# Patient Record
Sex: MALE | Race: WHITE
[De-identification: names, ages, dates, MRNs, and addresses within clinical notes are randomized per-mention and may not be internally consistent; named-entity substitution may affect disease eponyms.]

---

## 2019-03-16 ENCOUNTER — HOSPITAL ENCOUNTER (OUTPATIENT)
Dept: HOSPITAL 88 - ER | Age: 84
Setting detail: OBSERVATION
LOS: 3 days | Discharge: HOME | End: 2019-03-19
Attending: INTERNAL MEDICINE | Admitting: INTERNAL MEDICINE
Payer: MEDICARE

## 2019-03-16 VITALS — BODY MASS INDEX: 22.79 KG/M2 | WEIGHT: 150.38 LBS | HEIGHT: 68 IN

## 2019-03-16 VITALS — DIASTOLIC BLOOD PRESSURE: 77 MMHG | SYSTOLIC BLOOD PRESSURE: 191 MMHG

## 2019-03-16 DIAGNOSIS — G30.9: ICD-10-CM

## 2019-03-16 DIAGNOSIS — R53.81: ICD-10-CM

## 2019-03-16 DIAGNOSIS — F02.80: ICD-10-CM

## 2019-03-16 DIAGNOSIS — R55: Primary | ICD-10-CM

## 2019-03-16 DIAGNOSIS — Z88.5: ICD-10-CM

## 2019-03-16 DIAGNOSIS — I95.9: ICD-10-CM

## 2019-03-16 DIAGNOSIS — R53.1: ICD-10-CM

## 2019-03-16 LAB
ALBUMIN SERPL-MCNC: 2.9 G/DL (ref 3.5–5)
ALBUMIN/GLOB SERPL: 0.7 {RATIO} (ref 0.8–2)
ALP SERPL-CCNC: 44 IU/L (ref 40–150)
ALT SERPL-CCNC: 16 IU/L (ref 0–55)
ANION GAP SERPL CALC-SCNC: 12.7 MMOL/L (ref 8–16)
BASOPHILS # BLD AUTO: 0 10*3/UL (ref 0–0.1)
BASOPHILS NFR BLD AUTO: 0.5 % (ref 0–1)
BUN SERPL-MCNC: 19 MG/DL (ref 7–26)
BUN/CREAT SERPL: 16 (ref 6–25)
CALCIUM SERPL-MCNC: 7.8 MG/DL (ref 8.4–10.2)
CHLORIDE SERPL-SCNC: 106 MMOL/L (ref 98–107)
CK MB SERPL-MCNC: 1.1 NG/ML (ref 0–5)
CK SERPL-CCNC: 153 IU/L (ref 30–200)
CO2 SERPL-SCNC: 26 MMOL/L (ref 22–29)
DEPRECATED APTT PLAS QN: 36.2 SECONDS (ref 23.8–35.5)
DEPRECATED INR PLAS: 1.08
DEPRECATED NEUTROPHILS # BLD AUTO: 3.2 10*3/UL (ref 2.1–6.9)
EGFRCR SERPLBLD CKD-EPI 2021: 59 ML/MIN (ref 60–?)
EOSINOPHIL # BLD AUTO: 0.2 10*3/UL (ref 0–0.4)
EOSINOPHIL NFR BLD AUTO: 3.5 % (ref 0–6)
EOSINOPHIL NFR BLD MANUAL: 3 % (ref 0–7)
ERYTHROCYTE [DISTWIDTH] IN CORD BLOOD: 12.6 % (ref 11.7–14.4)
GLOBULIN PLAS-MCNC: 4 G/DL (ref 2.3–3.5)
GLUCOSE SERPLBLD-MCNC: 93 MG/DL (ref 74–118)
HCT VFR BLD AUTO: 42.1 % (ref 38.2–49.6)
HGB BLD-MCNC: 14.2 G/DL (ref 14–18)
LYMPHOCYTES # BLD: 1.8 10*3/UL (ref 1–3.2)
LYMPHOCYTES NFR BLD AUTO: 30.7 % (ref 18–39.1)
LYMPHOCYTES NFR BLD MANUAL: 29 % (ref 19–48)
MCH RBC QN AUTO: 32.3 PG (ref 28–32)
MCHC RBC AUTO-ENTMCNC: 33.7 G/DL (ref 31–35)
MCV RBC AUTO: 95.7 FL (ref 81–99)
MONOCYTES # BLD AUTO: 0.7 10*3/UL (ref 0.2–0.8)
MONOCYTES NFR BLD AUTO: 11.1 % (ref 4.4–11.3)
MONOCYTES NFR BLD MANUAL: 12 % (ref 3.4–9)
NEUTS SEG NFR BLD AUTO: 53.9 % (ref 38.7–80)
NEUTS SEG NFR BLD MANUAL: 50 % (ref 40–74)
PLAT MORPH BLD: NORMAL
PLATELET # BLD AUTO: 193 X10E3/UL (ref 140–360)
PLATELET # BLD EST: ADEQUATE 10*3/UL
POTASSIUM SERPL-SCNC: 3.7 MMOL/L (ref 3.5–5.1)
PROTHROMBIN TIME: 14.5 SECONDS (ref 11.9–14.5)
RBC # BLD AUTO: 4.4 X10E6/UL (ref 4.3–5.7)
RBC MORPH BLD: NORMAL
SODIUM SERPL-SCNC: 141 MMOL/L (ref 136–145)

## 2019-03-16 PROCEDURE — 83880 ASSAY OF NATRIURETIC PEPTIDE: CPT

## 2019-03-16 PROCEDURE — 85025 COMPLETE CBC W/AUTO DIFF WBC: CPT

## 2019-03-16 PROCEDURE — 97139 UNLISTED THERAPEUTIC PX: CPT

## 2019-03-16 PROCEDURE — 82550 ASSAY OF CK (CPK): CPT

## 2019-03-16 PROCEDURE — 71045 X-RAY EXAM CHEST 1 VIEW: CPT

## 2019-03-16 PROCEDURE — 72125 CT NECK SPINE W/O DYE: CPT

## 2019-03-16 PROCEDURE — 80053 COMPREHEN METABOLIC PANEL: CPT

## 2019-03-16 PROCEDURE — 81001 URINALYSIS AUTO W/SCOPE: CPT

## 2019-03-16 PROCEDURE — 97162 PT EVAL MOD COMPLEX 30 MIN: CPT

## 2019-03-16 PROCEDURE — 85610 PROTHROMBIN TIME: CPT

## 2019-03-16 PROCEDURE — 36415 COLL VENOUS BLD VENIPUNCTURE: CPT

## 2019-03-16 PROCEDURE — 93005 ELECTROCARDIOGRAM TRACING: CPT

## 2019-03-16 PROCEDURE — 87086 URINE CULTURE/COLONY COUNT: CPT

## 2019-03-16 PROCEDURE — 93306 TTE W/DOPPLER COMPLETE: CPT

## 2019-03-16 PROCEDURE — 83036 HEMOGLOBIN GLYCOSYLATED A1C: CPT

## 2019-03-16 PROCEDURE — 70450 CT HEAD/BRAIN W/O DYE: CPT

## 2019-03-16 PROCEDURE — 93880 EXTRACRANIAL BILAT STUDY: CPT

## 2019-03-16 PROCEDURE — 84484 ASSAY OF TROPONIN QUANT: CPT

## 2019-03-16 PROCEDURE — 95816 EEG AWAKE AND DROWSY: CPT

## 2019-03-16 PROCEDURE — 82607 VITAMIN B-12: CPT

## 2019-03-16 PROCEDURE — 82553 CREATINE MB FRACTION: CPT

## 2019-03-16 PROCEDURE — 80048 BASIC METABOLIC PNL TOTAL CA: CPT

## 2019-03-16 PROCEDURE — 97116 GAIT TRAINING THERAPY: CPT

## 2019-03-16 PROCEDURE — 85730 THROMBOPLASTIN TIME PARTIAL: CPT

## 2019-03-16 PROCEDURE — 70551 MRI BRAIN STEM W/O DYE: CPT

## 2019-03-16 PROCEDURE — 84443 ASSAY THYROID STIM HORMONE: CPT

## 2019-03-16 NOTE — DIAGNOSTIC IMAGING REPORT
CT BRAIN WO



HISTORY: Fall



COMPARISON:  Report from head CT dated 9/8/2015 (images not available at time

of dictation)



Technique: 

Noncontrast axial scans were obtained from skull base to the vertex.  Coronal

and sagittal reconstructions obtained from the axial data.  One or more of the

following dose reduction techniques were used: Automated exposure control,

adjustment of the mA and/or kV according to patient size, and/or utilization of

iterative reconstruction technique.



DISCUSSION:



Scalp/Skull: Unremarkable.

Brain sulci: Mildly prominent.

Ventricles: Compensatory dilatation.

Extra-axial spaces: No masses or fluid collections. Carotid and vertebral

artery calcifications are present.



Parenchyma: 

Mild bilateral deep white matter hypodensity is likely chronic microvascular

ischemic change. 

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

Otherwise, no masses, hemorrhage, or large vascular territory acute infarct.



Dural sinuses:  No abnormal densities.

Sellar/Suprasellar region: Intact.

Skull base: Intact.

Incidental findings: Mild scattered paranasal sinus mucosal thickening is

partially visualized. Both ocular lenses are thinned.



IMPRESSION:

1.  No acute intracranial abnormalities.

2.  Mild supratentorial chronic microvascular ischemic change. Mild generalized

cerebral volume loss.





Signed by: Dr. Jeff Carlisle M.D. on 3/16/2019 6:30 PM

## 2019-03-16 NOTE — DIAGNOSTIC IMAGING REPORT
CT CERVICAL SPINE WO



HISTORY: Fall



COMPARISON:  Concurrent head CT



TECHNIQUE: CT of the cervical spine without contrast.  Sagittal and coronal

reformations were created.  One or more of the following dose reduction

techniques were used: Automated exposure control, adjustment of the mA and/or

kV according to patient size, and/or utilization of iterative reconstruction

technique.



FINDINGS:  

Mild bone demineralization limits evaluation.

Cervical lordosis is straightened.

Thoracic dextroscoliosis is partially visualized.



No definite acute fracture or compression deformity is seen.  

The craniocervical junction is intact. 

No gross spinal canal masses are seen. 

The paravertebral and paraspinal soft tissues are unremarkable. 



Multilevel spondylosis is advanced at C5-C6 and C6-C7. The C3 and C4 vertebral

bodies are fused. Prominent multilevel bilateral facet arthrosis is also

present. There is associated minimal grade 1 anterolisthesis of C4 on C5, C6 on

C7, C7 on T1.



Prominent bilateral carotid bulb calcifications are present. There is a small

partially calcified left thyroid nodule.



IMPRESSION:  

1.  No acute osseous abnormalities.

2.  Multilevel spondylosis, advanced at C5-C6 and C6-C7. 

3.  Prominent multilevel bilateral facet arthrosis. Associated minimal grade 1

anterolisthesis of C4 on C5, C6 on C7, and C7 on T1.



Signed by: Dr. Jeff Carlisle M.D. on 3/16/2019 6:38 PM

## 2019-03-16 NOTE — DIAGNOSTIC IMAGING REPORT
EXAMINATION:  CHEST SINGLE (PORTABLE)    



INDICATION:      

^ERMD ORDER

^[id number]

^1745

^Y



COMPARISON:  None

     

FINDINGS:  AP view   



TUBES and LINES:  None.



LUNGS:  Lungs are well inflated.  Central vascular congestion and mild

interstitial edema.      



PLEURA:  No pleural effusion or pneumothorax.



HEART AND MEDIASTINUM:  The cardiomediastinal silhouette is unremarkable. Aorta

is calcified and mildly tortuous.



BONES AND SOFT TISSUES:  No acute osseous lesion.  Soft tissues are

unremarkable.



UPPER ABDOMEN: No free air under the diaphragm.    



IMPRESSION: 

Central vascular congestion and mild interstitial edema.





Signed by: Dr. Braxton Jamil MD on 3/16/2019 6:59 PM

## 2019-03-16 NOTE — XMS REPORT
Patient Summary Document

                             Created on: 2019



TATIANA US

External Reference #: [id number]

: 1935

Sex: Male



Demographics







                          Address                   [address] [id number]

 

                          Home Phone                [phone]

 

                          Preferred Language        Unknown

 

                          Marital Status            Unknown

 

                          Faith Affiliation     Unknown

 

                          Race                      Unknown

 

                                        Additional Race(s)  

 

                          Ethnic Group              Unknown





Author







                          Author                    [facility]

 

                          Address                   Unknown

 

                          Phone                     Unavailable







Care Team Providers







                    Care Team Member Name    Role                Phone

 

                    SWEET, A LAIRD      Unavailable         Unavailable







Problems

This patient has no known problems.



Allergies, Adverse Reactions, Alerts

This patient has no known allergies or adverse reactions.



Medications

This patient has no known medications.



Results







           Test Description    Test Time    Test Comments    Text Results    Atomic Results    Result

 Comments

 

                CHEST SINGLE (PORTABLE)    2019 18:58:00                                                   

                                                       [place] [address]      Patient Name: TATIANA US                       
           MR #: [id number]                     : 1935                 
                 Age/Sex: 84/M  Acct #: [id number]                            
 Req #: [id number]  Adm Physician:                                              
       Ordered by: IVAN PÉREZ NP                            Report #: 
[id number]        Location: ER                                      Room/Bed:    
                
___________________________________________________________________________________________________
   Procedure: [id number] DX/CHEST SINGLE (PORTABLE)  Exam Date: 19         
                  Exam Time: 1745                                              
REPORT STATUS: Signed    EXAMINATION:  CHEST SINGLE (PORTABLE)          IN
DICATION:          ERMD ORDER    [id number]    1745    Y      COMPARISON:  None   
       FINDINGS:  AP view         TUBES and LINES:  None.      LUNGS:  Lungs are
well inflated.  Central vascular congestion and mild   interstitial edema.      
     PLEURA:  No pleural effusion or pneumothorax.      HEART AND MEDIASTINUM:  
The cardiomediastinal silhouette is unremarkable. Aorta   is calcified and mild
ly tortuous.      BONES AND SOFT TISSUES:  No acute osseous lesion.  Soft 
tissues are   unremarkable.      UPPER ABDOMEN: No free air under the diaphragm.
         IMPRESSION:    Central vascular congestion and mild interstitial edema.
        Signed by: Dr. Braxton Koroma MD on 3/16/2019 6:59 PM        Dictated 
By: BRAXTON KOROMA MD  Electronically Signed By: BRAXTON KOROMA MD on 19  Transcribed By: JUANITO on 19       COPY TO:   IVAN PÉREZ 
NP                                       

 

                CT CERVICAL SPINE WO    2019 18:30:00                                                      

                                                    [place] [address]      Patient Name: TATIANA US                                 
 MR #: [id number]                     : 1935                           
       Age/Sex: 84/M  Acct #: [id number]                              Req #: 
[id number]  Adm Physician:                                                      
Ordered by: IVAN PÉREZ NP                            Report #: [id number]  
     Location: ER                                      Room/Bed:                
    
___________________________________________________________________________________________________
   Procedure: [id number] CT/CT CERVICAL SPINE WO  Exam Date: 19            
               Exam Time: 1745                                              
REPORT STATUS: Signed    CT CERVICAL SPINE WO      HISTORY: Fall      COMPARI
SON:  Concurrent head CT      TECHNIQUE: CT of the cervical spine without 
contrast.  Sagittal and coronal   reformations were created.  One or more of the
following dose reduction   techniques were used: Automated exposure control, 
adjustment of the mA and/or   kV according to patient size, and/or utilization 
of iterative reconstruction   technique.      FINDINGS:     Mild bone 
demineralization limits evaluation.   Cervical lordosis is straightened.   
Thoracic dextroscoliosis is partially visualized.      No definite acute 
fracture or compression deformity is seen.     The craniocervical junction is 
intact.    No gross spinal canal masses are seen.    The paravertebral and 
paraspinal soft tissues are unremarkable.       Multilevel spondylosis is 
advanced at C5-C6 and C6-C7. The C3 and C4 vertebral   bodies are fused. 
Prominent multilevel bilateral facet arthrosis is also   present. There is 
associated minimal grade 1 anterolisthesis of C4 on C5, C6 on   C7, C7 on T1.   
  Prominent bilateral carotid bulb calcifications are present. There is a small 
 partially calcified left thyroid nodule.      IMPRESSION:     1.  No acute 
osseous abnormalities.   2.  Multilevel spondylosis, advanced at C5-C6 and C6-
C7.    3.  Prominent multilevel bilateral facet arthrosis. Associated minimal 
grade 1   anterolisthesis of C4 on C5, C6 on C7, and C7 on T1.      Signed by: 
Dr. Jeff Carlisle M.D. on 3/16/2019 6:38 PM        Dictated By: JEFF CARLISLE MD  Electronically Signed By: JEFF CARLISLE MD on 19  
Transcribed By: JUANITO on 19       COPY TO:   IVAN PÉREZ NP    
                                         

 

                CT BRAIN WO     2019 18:25:00                                                               

                                           [place] [address]    
 Patient Name: TATIANA US                                   MR #: 
[id number]                     : 1935                                  
Age/Sex: 84/M  Acct #: [id number]                              Req #: [id number]
[id number]  Adm Physician:                                                      
Ordered by: IVAN PÉREZ NP                            Report #: [id number]  
     Location: ER                                      Room/Bed:                
    
___________________________________________________________________________________________________
   Procedure: [id number] CT/CT BRAIN WO  Exam Date: 19                     
      Exam Time: 1745                                              REPORT 
STATUS: Signed    CT BRAIN WO      HISTORY: Fall      COMPARISON:  Report from 
head CT dated 2015 (images not available at time   of dictation)      
Technique:    Noncontrast axial scans were obtained from skull base to the 
vertex.  Coronal   and sagittal reconstructions obtained from the axial data.  
One or more of the   following dose reduction techniques were used: Automated 
exposure control,   adjustment of the mA and/or kV according to patient size, 
and/or utilization of   iterative reconstruction technique.      DISCUSSION:    
 Scalp/Skull: Unremarkable.   Brain sulci: Mildly prominent.   Ventricles: 
Compensatory dilatation.   Extra-axial spaces: No masses or fluid collections. 
Carotid and vertebral   artery calcifications are present.      Parenchyma:    
Mild bilateral deep white matter hypodensity is likely chronic microvascular   
ischemic change.    There is an old small lacunar infarct in the anterior left 
thalamus.   Otherwise, no masses, hemorrhage, or large vascular territory acute 
infarct.      Dural sinuses:  No abnormal densities.   Sellar/Suprasellar 
region: Intact.   Skull base: Intact.   Incidental findings: Mild scattered 
paranasal sinus mucosal thickening is   partially visualized. Both ocular lenses
are thinned.      IMPRESSION:   1.  No acute intracranial abnormalities.   2.  
Mild supratentorial chronic microvascular ischemic change. Mild generalized   
cerebral volume loss.         Signed by: Dr. Jeff Carlisle M.D. on 3/16/2019 
6:30 PM        Dictated By: JEFF CARLISLE MD  Electronically Signed By: 
JEFF CARLISLE MD on 19  Transcribed By: JUANITO on 19  
    COPY TO:   IVAN PÉREZ NP

## 2019-03-16 NOTE — NUR
Received patient from the Emergency Room via stretcher accompanied by his son and E.R. 
staff. Patient is alert with confusion. Can ambulate and transfer with supervision. Patient 
was made comfortable in bed. Oriented to the room. side rails up. Bed alarm on. Call light 
within reached.

## 2019-03-17 VITALS — SYSTOLIC BLOOD PRESSURE: 166 MMHG | DIASTOLIC BLOOD PRESSURE: 75 MMHG

## 2019-03-17 VITALS — SYSTOLIC BLOOD PRESSURE: 166 MMHG | DIASTOLIC BLOOD PRESSURE: 76 MMHG

## 2019-03-17 VITALS — SYSTOLIC BLOOD PRESSURE: 178 MMHG | DIASTOLIC BLOOD PRESSURE: 82 MMHG

## 2019-03-17 VITALS — SYSTOLIC BLOOD PRESSURE: 140 MMHG | DIASTOLIC BLOOD PRESSURE: 62 MMHG

## 2019-03-17 VITALS — SYSTOLIC BLOOD PRESSURE: 134 MMHG | DIASTOLIC BLOOD PRESSURE: 68 MMHG

## 2019-03-17 VITALS — DIASTOLIC BLOOD PRESSURE: 64 MMHG | SYSTOLIC BLOOD PRESSURE: 142 MMHG

## 2019-03-17 VITALS — SYSTOLIC BLOOD PRESSURE: 152 MMHG | DIASTOLIC BLOOD PRESSURE: 78 MMHG

## 2019-03-17 VITALS — DIASTOLIC BLOOD PRESSURE: 77 MMHG | SYSTOLIC BLOOD PRESSURE: 191 MMHG

## 2019-03-17 VITALS — DIASTOLIC BLOOD PRESSURE: 70 MMHG | SYSTOLIC BLOOD PRESSURE: 140 MMHG

## 2019-03-17 LAB
ANION GAP SERPL CALC-SCNC: 12.6 MMOL/L (ref 8–16)
BACTERIA URNS QL MICRO: (no result) /HPF
BASOPHILS # BLD AUTO: 0 10*3/UL (ref 0–0.1)
BASOPHILS NFR BLD AUTO: 0.4 % (ref 0–1)
BILIRUB UR QL: NEGATIVE
BUN SERPL-MCNC: 19 MG/DL (ref 7–26)
BUN/CREAT SERPL: 19 (ref 6–25)
CALCIUM SERPL-MCNC: 7.6 MG/DL (ref 8.4–10.2)
CHLORIDE SERPL-SCNC: 106 MMOL/L (ref 98–107)
CLARITY UR: (no result)
CO2 SERPL-SCNC: 25 MMOL/L (ref 22–29)
COLOR UR: YELLOW
DEPRECATED NEUTROPHILS # BLD AUTO: 3.7 10*3/UL (ref 2.1–6.9)
EGFRCR SERPLBLD CKD-EPI 2021: > 60 ML/MIN (ref 60–?)
EOSINOPHIL # BLD AUTO: 0.1 10*3/UL (ref 0–0.4)
EOSINOPHIL NFR BLD AUTO: 2.1 % (ref 0–6)
EPI CELLS URNS QL MICRO: (no result) /LPF
ERYTHROCYTE [DISTWIDTH] IN CORD BLOOD: 12.3 % (ref 11.7–14.4)
GLUCOSE SERPLBLD-MCNC: 123 MG/DL (ref 74–118)
HCT VFR BLD AUTO: 39.2 % (ref 38.2–49.6)
HGB BLD-MCNC: 13.2 G/DL (ref 14–18)
KETONES UR QL STRIP.AUTO: (no result)
LEUKOCYTE ESTERASE UR QL STRIP.AUTO: NEGATIVE
LYMPHOCYTES # BLD: 1.4 10*3/UL (ref 1–3.2)
LYMPHOCYTES NFR BLD AUTO: 24.5 % (ref 18–39.1)
MCH RBC QN AUTO: 31.7 PG (ref 28–32)
MCHC RBC AUTO-ENTMCNC: 33.7 G/DL (ref 31–35)
MCV RBC AUTO: 94.2 FL (ref 81–99)
MONOCYTES # BLD AUTO: 0.4 10*3/UL (ref 0.2–0.8)
MONOCYTES NFR BLD AUTO: 7.3 % (ref 4.4–11.3)
NEUTS SEG NFR BLD AUTO: 65.5 % (ref 38.7–80)
NITRITE UR QL STRIP.AUTO: NEGATIVE
PLATELET # BLD AUTO: 205 X10E3/UL (ref 140–360)
POTASSIUM SERPL-SCNC: 3.6 MMOL/L (ref 3.5–5.1)
PROT UR QL STRIP.AUTO: (no result)
RBC # BLD AUTO: 4.16 X10E6/UL (ref 4.3–5.7)
SODIUM SERPL-SCNC: 140 MMOL/L (ref 136–145)
SP GR UR STRIP: 1.02 (ref 1.01–1.02)
UROBILINOGEN UR STRIP-MCNC: 0.2 MG/DL (ref 0.2–1)
WBC #/AREA URNS HPF: (no result) /HPF (ref 0–5)

## 2019-03-17 RX ADMIN — SODIUM CHLORIDE SCH MLS/HR: 9 INJECTION, SOLUTION INTRAVENOUS at 18:53

## 2019-03-17 RX ADMIN — ENOXAPARIN SODIUM SCH MG: 30 INJECTION SUBCUTANEOUS at 16:33

## 2019-03-17 RX ADMIN — MEMANTINE HYDROCHLORIDE SCH MG: 10 TABLET ORAL at 16:33

## 2019-03-17 NOTE — CONSULTATION
DATE OF CONSULTATION:  2019

 

Neurology Consult 

 

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 
man with

past medical history significant for hypotension and dementia, presumably with 
the

Alzheimer's type, admitted to [facility] on 2019, with 
syncope

versus seizure. 

 

At approximately 0300 hours on 2019, the patient was using the 
restroom with

the aid of his wife.  While washing his hands, Dr. Veronica was observed to have
shaking

in his arms and legs.  The patient then lost consciousness.  Dr. Veronica fell 
to the

ground, hitting his head on a wooden door frame.  While on the ground, there was
no

stiffening or shaking of the arms and legs.  There was no tongue biting.  The 
patient's

wife does endorse both bladder and bowel incontinence.  The duration of 
unconsciousness

lasted for approximately 30 seconds.  According to the wife, as soon as she 
lifted the

patient's legs in the air, the patient regained consciousness.  When he regained

consciousness, Dr. Veronica recognized his wife as well as his bathroom, but was

uncertain as to why he was lying on the floor.  Prior to the event described 
above, Dr. Veronica reported possible dizziness, which is further described as a 
lightheaded

sensation.  Otherwise, he does not recall experiencing chest pain or tightness,

palpitations, shortness of breath, numbness or tingling, or an epigastric rising

sensation. 

 

According to the patient's wife, Dr. Veronica experienced similar symptoms 
approximately

2 weeks ago. 

 

On the advice of his primary care physician, Dr. Veronica presented to the 
Emergency

Center at [facility] on the evening of 2019, for further

evaluation of his symptoms.  Upon arrival in the emergency room, the patient was

afebrile with a blood pressure of 142/70 mmHg and a pulse of 71 beats per 
minute.  His

neurological examination was significant for altered mental status.  However, 
the

patient's family reported Dr. Veronica was at his cognitive baseline.  No other 
deficits

were noted.  While in the Emergency Center, the patient underwent a CT of the 
brain

without contrast, which did not reveal evidence of recent large territorial 
ischemia or

hemorrhage.  Dr. Veronica was admitted to [facility] under 
observation

status for further evaluation and treatment of his symptoms. 

 

There is no known history of febrile or other seizures.  There is no known 
family

history of seizures.  There is no known prior head trauma or 
meningitis/encephalitis. 

 

REVIEW OF SYSTEMS:

Syncope versus seizure, dizziness, which is further described as 
lightheadedness,

otherwise a 12-point review of systems is negative. 

 

PAST MEDICAL HISTORY:  Hypotension, dementia, presumably of the Alzheimer's 
type.

 

PAST SURGICAL HISTORY:  Bilateral cataract removal.

 

PAST HOSPITALIZATIONS:  None.

 

FAMILY MEDICAL HISTORY:  The patient's paternal and maternal grandparents are 
.

Their medical histories are unknown.  The patient's father is .  He 
in his

sleep, cause unknown.  Dr. Veroncia' mother had diabetes mellitus.  The patient 
has 1

sibling, a sister, who is alive and has Alzheimer's disease as well.  Dr. Veronica has 2

sons, both of whom are alive and healthy. 

 

SOCIAL HISTORY:  Dr. Veronica is  and lives with his wife.  The patient 
is a

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  There is no reported current or prior 
tobacco,

alcohol, or recreational drug use. 

 

HOME MEDICATIONS:  

1. Aspirin 81 mg by mouth daily.

2. Fludrocortisone 0.1 mg one-half tablet by mouth daily.

3. Folic acid 1 mg by mouth daily.

4. Memantine 10 mg by mouth twice daily.

5. Multivitamin 1 tablet by mouth daily.

6. Potassium chloride 10 mEq by mouth daily.

7. Metamucil 1 packet by mouth daily.

8. Rivastigmine 13.3 mg patch applied topically daily.

 

ALLERGIES:  CODEINE, HYDROCODONE, MIRTAZAPINE.  NO KNOWN FOOD ALLERGIES.  NO 
KNOWN

ALLERGIES TO LATEX.  NO KNOWN ALLERGIES TO IODINE OR OTHER CONTRAST MATERIALS. 

 

PHYSICAL EXAMINATION:

VITAL SIGNS:  Height 68 inches, weight 144 pounds, BMI 21.9 kg/m2, blood 
pressure 142/64

mmHg, pulse 63 beats per minute, respiratory rate 20 breaths per minute, oxygen

saturation 95% on room air. 

GENERAL:  The patient is awake and alert, does not appear distress. 

HEENT:  Normocephalic, atraumatic.  Pupils are surgical.  Moist mucous 
membranes. 

NECK:  Supple.  No appreciable thyromegaly.  No appreciable carotid bruits. 

CARDIOVASCULAR:  S1 and S2, regular rate and rhythm.  No murmurs, rubs, or 
gallops. 

RESPIRATORY:  Clear to auscultation bilaterally.  No wheezes, rhonchi, or rales.


EXTREMITIES:  The skin is warm and dry.  No clubbing, cyanosis, or edema.  The 
posterior

tibial and dorsalis pedis pulses are 1+ and symmetric. 

SKIN:  No rashes or lesions. 

NEUROLOGIC:  Memory/Attention:  The patient is awake and alert, oriented to 
person only. 

Cranial nerves:  Cranial nerve I - not tested.  Cranial nerve II, III, IV, and 
VI -

pupils are surgical.  Extraocular movements intact.  No nystagmus.  Cranial 
nerve V -

sensation to light touch is intact in the bilateral V1 through V3 distributions.

Strength in the temporalis and masseter muscles is within normal limits.  
Cranial nerve

VII - the face is symmetric as are all facial movements.  Strength is within 
normal

limits.  Cranial nerve VIII - hearing is diminished to finger rub bilaterally.  
Cranial

nerve IX, X - the soft palate elevates equally and symmetrically.  Cranial nerve
XI -

normal strength of the bilateral sternocleidomastoid and trapezius muscles.  
Cranial

nerve XII - the tongue protrudes midline and moves symmetrically from 
side-to-side. 

Strength:  Bulk is diminished throughout.  Dr. Veronica has difficulty 
participating in

a formal assessment of strength.  He moves all extremities equally and 
symmetrically.

Strength is grossly 4/5 at least.  Tone is normal. 

DTRs:  Deep tendon reflexes are 1+ and symmetric at the triceps, biceps,

brachioradialis, and patellas.  Deep tendon reflexes are absent and symmetric at
the

Achilles.  Plantar responses are flexor bilaterally. 

Sensation:  Sensation is intact to light touch in both arms and both legs. 

Cerebellar:  Unable to assess secondary to the patient not understanding the

instructions for finger-nose-finger and heel-shin movements. 

Gait:  Deferred. 

Speech:  Spontaneous speech is mildly dysarthric without aphasia.  Repetition is
intact. 

Involuntary Movements:  None. 

Pronator Drift:  None.



LABORATORY DATA:  A comprehensive metabolic panel is significant for an 
estimated GFR of

59, calcium is 78, albumin of 2.9, and globulin of 4.0.  B-natriuretic peptide 
126.7.

The CBC with differential and platelets is unremarkable.  PT 14.5, INR 1.08, PTT
36.2.

The urinalysis revealed hazy urine with 1+ protein, 2+ ketones, 2+ blood, 11 to 
20 red

blood cells.  A urine culture is pending. 

 

DIAGNOSTIC STUDIES:  

1. Electrocardiogram, 2019:  Normal sinus rhythm at 62 beats per minute.

2. Chest x-ray, 2019:  Central vascular congestion and mild interstitial 
edema.

3. CT of the cervical spine, 2019:  No acute osseous abnormalities.  
Multilevel

spondylosis, advanced at C5-C6 and C6-C7.  Prominent multilevel bilateral facet

arthrosis.  Associated minimal grade 1 anterolisthesis of C4 on C5, C6 on C7, 
and C7 on

T1. 

4. CT of the brain without contrast, 2019:  On my review, there is no 
evidence of

recent large territorial ischemia, hemorrhage, mass, or mass effect.  A prior 
lacunar

infarct is seen in the anterior thalamus on the left.  There is diffuse cerebral
atrophy

with compensatory dilatation of the ventricles, appropriate for the patient's 
age.

Their findings compatible with mild-to-moderate chronic small-vessel ischemic 
disease. 

 

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 
history as

detailed, admitted to [facility] on 2019, with syncope 
versus

seizure.  Other than cognitive deficits, the patient's neurological examination 
is

nonfocal.  His laboratory data and other diagnostic studies have been reviewed 
and are

documented above. 

 

There are elements of the patient's history, which raise concerns for seizure.  
Those

include:  Shaking of the arms prior to loss of consciousness, duration of loss 
of

consciousness, bladder and bowel incontinence associated with loss of 
consciousness.

Due to these elements of the patient's history, further evaluation will be 
ordered as

follows: 

1. For seizures, an MRI of the brain without contrast will be ordered to 
evaluate for

structural anomalies, which may give rise to seizures.  A routine EEG will be 
ordered to

evaluate for abnormal electrical activity, which may give rise to seizures. 

2. For dementia, treatment with the patient's home medications of Rivastigmine 
9.5 mg

per 24-hour patch, apply one patch topically daily and memantine 10 mg by mouth 
twice

daily will be continued. 

3. Defer treatment of the remaining medical comorbidities to the primary and 
other

services following the patient. 

Thank you for this consultation.  I will continue to follow the patient while he
remains

in the hospital. 

 

TIME SPENT:  50 minutes.

 

 

 

 

______________________________

Radha Romero MD

 

CP/MODL

D:  2019 14:45:22

T:  2019 21:32:08

Job #:  [id number]

 

MTDD

## 2019-03-17 NOTE — HISTORY AND PHYSICAL
CHIEF COMPLAINT:  Syncopal episodes.

 

HISTORY OF PRESENT ILLNESS:  An 84-year-old  male with known history of

Alzheimer's dementia and questionable orthostatic hypotension, who was just recently

diagnosed with acute bronchitis being treated with IV Rocephin and antibiotics, presents

now to the ED with a syncopal episode that has been ongoing over the last several days.

The patient's son is at bedside and full history was given by the son.  According to the

son, approximately like a week ago, the father was getting up, walking and then he

passed out, at that time fell over and lost consciousness for about 16 seconds according

to the son.  No prodromal effects of slurred speech, facial droop, weakness anywhere, no

palpitations or any chest wound has occurred.  At that time, they talked to the primary

care doctor, thought it was orthostatic in nature and was started on Florinef.  The

patient also had another episode of syncopal episode yesterday, in which he passed out

and lost consciousness again, that lasted from another 15-20 seconds, again no prodromal

effects prior to this occurring and came to the ER for further evaluation and

management.  No reports of any recent sickness, cough, congestion.  He was treated for

acute bronchitis according to the family at bedside by his PCP.  The patient seen and

evaluated at bedside on the Medical floor, currently doing well with no complaints.  His

vital signs were stable when I evaluated him. 

 

REVIEW OF SYSTEMS:

Pertinent positives:  Syncopal episode, loss of consciousness. 

 

Pertinent negatives:  Denies any chest pain, palpitation, nausea, vomiting, diarrhea,

dysuria, hematuria, frequency, urgency, lightheadedness, dizziness, abdominal pain,

headaches, shortness of breath, cough, congestion, fever, or any other complaints. 

 

The rest of the 14-point review of systems have been reviewed with the patient and are

negative. 

 

ALLERGIES:  CODEINE, HYDROCODONE, MIRTAZAPINE.

 

HOME MEDICATIONS:  Aspirin 81 mg daily, Florinef 0.1 mg daily, folic acid 1 mg daily,

Namenda 10 mg p.o. b.i.d., potassium supplement, Exelon 13.3 mg one patch daily. 

 

PAST MEDICAL HISTORY:  Alzheimer's dementia, questionable orthostatic hypotension in the

past. 

 

SURGICAL HISTORY:  Reports none.

 

FAMILY HISTORY:  Hypertension.

 

SOCIAL HISTORY:  No drugs.  No alcohol.  Does not smoke.  Good social support, his son

was at bedside. 

 

PHYSICAL EXAMINATION:

VITAL SIGNS:  Temperature 96.5, pulse is 63, respiratory rate is 20, blood pressure is

142/64, pulse ox 95% on room air. 

GENERAL:  Not in acute distress.  Alert and oriented x3.  Cooperative on examination. 

HEENT:  Head is normocephalic and atraumatic.  Eyes; pupils are equal, round, and

reactive to light bilaterally.  Extraocular movements are intact bilaterally.  Throat,

no evidence of erythema or exudates in the posterior pharynx.  Has poor dentition. 

NECK:  Supple.  Good range of motion. 

PULMONARY:  Clear to auscultation bilaterally.  No wheezing, no rales, no rhonchi, no

crackles appreciated. 

CARDIOVASCULAR:  Positive S1, S2.  No murmurs, rubs, or gallops appreciated. 

ABDOMEN:  Soft, nondistended, and nontender to palpation.  Bowel sounds present. 

MUSCULOSKELETAL:  Strength is 5/5 throughout.  No evidence of any muscle deficits on

examination.  No weakness appreciated. 

NEUROLOGICAL:  Cranial nerves II through XII grossly intact.  No evidence of any

neurological deficits on exam. 

SKIN:  Intact.  Warm to touch.  Good cap refill. 

PSYCHIATRIC:  Normal affect and mood. 

EXTREMITIES:  No edema.  Good range of motion throughout

LABORATORY DATA:  Labs show white count 5.9, hemoglobin 14, hematocrit of 42, platelets

of 193.  Coagulation; PT 14, INR 1, PTT 36.2.  Chemistry; sodium 141, potassium 3.7,

chloride is 106, bicarb 26, anion gap is 12, BUN is 19, creatinine 1.1, glucose 93,

calcium 7.8.  Total bilirubin is 0.7, AST 30, ALT 16, alkaline phosphatase 44.  Troponin

is 0.010.  .  Albumin 2.9.  Urinalysis; 2+ blood, 2+ ketones, 1+ protein, 11-20

rbc's, no wbc's, no leukocyte esterase, no nitrites.  Urine cultures are pending. 

 

IMAGING STUDIES:  Chest x-ray shows some central vascular congestion, mild interstitial

edema.  CT cervical spine shows no acute findings.  Shows evidence of osteoarthritis,

multilevel.  CT brain, no acute intracranial abnormalities. 

 

IMPRESSION:  

1. Syncopal episode.

2. History of orthostatic hypotension.

3. Alzheimer's dementia.

 

PLAN:  At this time, I will go ahead and order a 2D echo and carotid ultrasound to be

read by Cardiology.  Cardiology has been consulted as well.  Continue cardiac telemetry.

 Also consult with Neurology.  Carotid ultrasound, MRI of the brain has been ordered as

well.  __________ a.m. labs.  We will also get orthostatic blood pressure readings as

well.  Resume same home medications.  Put on Lovenox for DVT prophylaxis.  We will also

get a.m. labs.  I discussed plan of care with son at bedside including patient with

nursing staff. 

 

 

 

 

______________________________

MD BHARATHI Tracey/MODL

D:  03/17/2019 13:40:29

T:  03/17/2019 19:42:36

Job #:  [id number]

## 2019-03-17 NOTE — NUR
Received change of shift report from AM nurse. Rounds completed. Patient in bed in sitting 
position with no c/o at this time.

## 2019-03-17 NOTE — NUR
Patient arrived to floor via stretcher. 

-------------------------------------------------------------------------------

Addendum: 03/17/19 at 2305 by Parvin Steven RN

-------------------------------------------------------------------------------

error wrong patient

## 2019-03-17 NOTE — NUR
NOTIFIED BY SON, HORACIO THAT HE ADMINISTERED NAMENDA, POTASSIUM, AND EXELON PATCH TO PT THOSE 
ARE HIS REGULAR HOME MEDICATIONS.

## 2019-03-17 NOTE — CONSULTATION
DATE OF CONSULTATION:  

 

Cardiology Consultation 

 

CONSULTING PHYSICIAN:  Dr. Cook.

 

REASON FOR CONSULTATION:  Syncope.

 

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

family has been in good health.  However, has stage 5 Alzheimer and is not able to

provide most of the medical information; however per the son, has been feeling well up

until two weeks ago where he experienced his first syncope episode, syncope episode

occurred while standing.  However, he could tell his care provider that he does not feel

well prior to the syncope episode.  For this reason, he was seen by his PCP.  PCP

initiated Florinef at 0.1 mg daily and continued to take that medication for about a

week.  However, had subsequent hypertension secondary to the medication.  For that

reason, he was asked to cut the medication in half.  After starting half of the

Florinef, he experienced two syncope episodes about 12 hours later.  These two syncope

episodes are reported to be both while he was standing, one of the more recently while

he was in the restroom.  He has no complaints at this time moment.  He states he feels

well and he just knows he is at the hospital.  He is not able to provide reliable

medical information. 

 

PAST FAMILY HISTORY:  Per son, positive for type 1 diabetes mellitus.

 

PAST MEDICAL HISTORY:  Alzheimer.

 

REVIEW OF SYSTEMS:

Negative except as mentioned above.

 

PHYSICAL EXAMINATION:

VITAL SIGNS:  Temperature 96.5, pulse 63, respiratory rate 20, blood pressure 142/64,

oxygen saturation 95% on room air. 

GENERAL:  Alert and oriented x1, resting comfortably in bed.  Does not appear to be in

any acute distress. 

NECK:  Supple.  No carotid bruits auscultated.  No JVD. 

LUNGS:  Clear to auscultation throughout.  No wheezing.  No rhonchi or crackles. 

CARDIOVASCULAR:  Regular rate and rhythm.  Normal S1, S2.  No gallops.  No murmurs. 

ABDOMEN:  Soft, nontender. 

LOWER EXTREMITIES:  No edema.

CARDIOVASCULAR MEDICATIONS:  30 mg of Lovenox subcu daily, aspirin 81 mg p.o. daily.

 

LABORATORY DATA:  From yesterday; WBC 5.93, hemoglobin 14.2, hematocrit 42.2, platelets

193.  Sodium 141, potassium 3.7, BUN 19, creatinine 1.17.  AST 30, ALT 16, alkaline

phosphatase 44, .7, INR 1.08, PT 14.5, PTT 36.2.  Chest x-ray on admission with

central vascular congestion and mild interstitial edema noted.  Brain CT with no acute

intracranial abnormalities.  Mild supratentorial chronic microvascular ischemic change,

mild generalized cerebral volume loss. 

 

TELEMETRY:  Sinus rhythm.

 

IMPRESSION:  

1. Vasovagal syncope.

2. Hypotension.

3. Alzheimer.

 

RECOMMENDATION:  Obtain echocardiogram and carotid Doppler.  Also check thyroid

function.  The patient may need ambulatory telemetry monitoring.  For now, keep in bed.

Continue the above-listed cardiac medications.  Fluid consumption 2 to 3 L daily.  We

will monitor this patient very closely. 

 

Thank you for this consultation, Dr. Cook.

 

 

Dictated by Kamilla Palomo, RAMOS

 

______________________________

Chano Koo MD

 

JWV/LEORA

D:  03/17/2019 13:59:57

T:  03/17/2019 15:13:33

Job #:  [id number]

## 2019-03-18 VITALS — SYSTOLIC BLOOD PRESSURE: 168 MMHG | DIASTOLIC BLOOD PRESSURE: 73 MMHG

## 2019-03-18 VITALS — SYSTOLIC BLOOD PRESSURE: 186 MMHG | DIASTOLIC BLOOD PRESSURE: 83 MMHG

## 2019-03-18 VITALS — DIASTOLIC BLOOD PRESSURE: 82 MMHG | SYSTOLIC BLOOD PRESSURE: 162 MMHG

## 2019-03-18 VITALS — SYSTOLIC BLOOD PRESSURE: 171 MMHG | DIASTOLIC BLOOD PRESSURE: 76 MMHG

## 2019-03-18 VITALS — DIASTOLIC BLOOD PRESSURE: 74 MMHG | SYSTOLIC BLOOD PRESSURE: 161 MMHG

## 2019-03-18 VITALS — SYSTOLIC BLOOD PRESSURE: 168 MMHG | DIASTOLIC BLOOD PRESSURE: 78 MMHG

## 2019-03-18 VITALS — SYSTOLIC BLOOD PRESSURE: 139 MMHG | DIASTOLIC BLOOD PRESSURE: 72 MMHG

## 2019-03-18 LAB
ANION GAP SERPL CALC-SCNC: 11.5 MMOL/L (ref 8–16)
BASOPHILS # BLD AUTO: 0 10*3/UL (ref 0–0.1)
BASOPHILS NFR BLD AUTO: 0.5 % (ref 0–1)
BUN SERPL-MCNC: 15 MG/DL (ref 7–26)
BUN/CREAT SERPL: 16 (ref 6–25)
CALCIUM SERPL-MCNC: 7.3 MG/DL (ref 8.4–10.2)
CHLORIDE SERPL-SCNC: 110 MMOL/L (ref 98–107)
CO2 SERPL-SCNC: 24 MMOL/L (ref 22–29)
DEPRECATED NEUTROPHILS # BLD AUTO: 2.9 10*3/UL (ref 2.1–6.9)
EGFRCR SERPLBLD CKD-EPI 2021: > 60 ML/MIN (ref 60–?)
EOSINOPHIL # BLD AUTO: 0.3 10*3/UL (ref 0–0.4)
EOSINOPHIL NFR BLD AUTO: 4.9 % (ref 0–6)
ERYTHROCYTE [DISTWIDTH] IN CORD BLOOD: 12.3 % (ref 11.7–14.4)
GLUCOSE SERPLBLD-MCNC: 82 MG/DL (ref 74–118)
HCT VFR BLD AUTO: 38 % (ref 38.2–49.6)
HGB BLD-MCNC: 12.7 G/DL (ref 14–18)
LYMPHOCYTES # BLD: 1.8 10*3/UL (ref 1–3.2)
LYMPHOCYTES NFR BLD AUTO: 32.4 % (ref 18–39.1)
MCH RBC QN AUTO: 31.4 PG (ref 28–32)
MCHC RBC AUTO-ENTMCNC: 33.4 G/DL (ref 31–35)
MCV RBC AUTO: 93.8 FL (ref 81–99)
MONOCYTES # BLD AUTO: 0.5 10*3/UL (ref 0.2–0.8)
MONOCYTES NFR BLD AUTO: 8.9 % (ref 4.4–11.3)
NEUTS SEG NFR BLD AUTO: 53.1 % (ref 38.7–80)
PLATELET # BLD AUTO: 199 X10E3/UL (ref 140–360)
POTASSIUM SERPL-SCNC: 3.5 MMOL/L (ref 3.5–5.1)
RBC # BLD AUTO: 4.05 X10E6/UL (ref 4.3–5.7)
SODIUM SERPL-SCNC: 142 MMOL/L (ref 136–145)
TSH SERPL DL<=0.005 MIU/L-ACNC: 2.12 UIU/ML (ref 0.35–4.94)

## 2019-03-18 RX ADMIN — FLUDROCORTISONE ACETATE SCH MG: 0.1 TABLET ORAL at 09:22

## 2019-03-18 RX ADMIN — ENOXAPARIN SODIUM SCH MG: 30 INJECTION SUBCUTANEOUS at 18:28

## 2019-03-18 RX ADMIN — RIVASTIGMINE SCH EACH: 9.5 PATCH, EXTENDED RELEASE TRANSDERMAL at 16:00

## 2019-03-18 RX ADMIN — MEMANTINE HYDROCHLORIDE SCH MG: 10 TABLET ORAL at 17:00

## 2019-03-18 RX ADMIN — SODIUM CHLORIDE SCH MLS/HR: 9 INJECTION, SOLUTION INTRAVENOUS at 05:04

## 2019-03-18 RX ADMIN — RIVASTIGMINE SCH EACH: 9.5 PATCH, EXTENDED RELEASE TRANSDERMAL at 09:00

## 2019-03-18 RX ADMIN — SODIUM CHLORIDE SCH MLS/HR: 9 INJECTION, SOLUTION INTRAVENOUS at 21:46

## 2019-03-18 RX ADMIN — MEMANTINE HYDROCHLORIDE SCH MG: 10 TABLET ORAL at 09:22

## 2019-03-18 RX ADMIN — Medication SCH MG: at 09:21

## 2019-03-18 RX ADMIN — ASPIRIN 81 MG CHEWABLE TABLET SCH MG: 81 TABLET CHEWABLE at 09:22

## 2019-03-18 NOTE — NUR
Patient denies pain at this time. AAOx1-2 with period of forgetfullness. Patient up out of 
bed to the restroom. Pt insist on walking to the bathroom. Assissted to bathroom. Son at the 
bedside.

## 2019-03-18 NOTE — PROGRESS NOTE
DATE:  03/18/2019

 

Medicine Progress Note 

 

SUBJECTIVE:  The patient is doing well today at baseline.  He was sitting in a chair,

eating his lunch.  MRI of the brain to be done later today.  EEG is performed, pending

results.  Discussed plan of care with son at bedside with nurse present. 

 

PHYSICAL EXAMINATION:

VITAL SIGNS:  Temperature is 95.8, pulse 71, respiratory rate is 18, blood pressure

139/72, pulse ox 95% on room air. 

GENERAL:  Not in acute distress.  Alert and oriented x3.  Cooperative on examination. 

HEENT:  Head is normocephalic and atraumatic.  Eyes; pupils are equal, round, and

reactive to light bilaterally.  Extraocular movements are intact bilaterally.  Throat,

no evidence of erythema or exudates in the posterior pharynx.  Has poor dentition. 

NECK:  Supple.  Good range of motion. 

PULMONARY:  Clear to auscultation bilaterally.  No wheezing, no rales, no rhonchi, no

crackles appreciated. 

CARDIOVASCULAR:  Positive S1, S2.  No murmurs, rubs, or gallops appreciated. 

ABDOMEN:  Soft, nondistended, and nontender to palpation.  Bowel sounds present. 

MUSCULOSKELETAL:  Strength is 5/5 throughout.  No evidence of any muscle deficits on

examination.  No weakness appreciated. 

NEUROLOGICAL:  Cranial nerves II through XII grossly intact.  No evidence of any

neurological deficits on exam.  The patient has baseline dementia. 

SKIN:  Intact.  Warm to touch.  Good cap refill. 

PSYCHIATRIC:  Baseline dementia. 

EXTREMITIES:  No edema.  Good range of motion throughout.

LABORATORY DATA:  Lab findings show white count 5.5, hemoglobin 12.7, hematocrit is 38,

and platelets of 199.  Coagulation; PT 14, INR 1, PTT 36.  Chemistry; sodium is 142,

potassium 3.5, chloride 110, bicarb 24, anion gap of 11, BUN is 15, creatinine is 0.91,

glucose is 82, calcium is 7.3.  Urinalysis found to be negative.  His vitamin B12 was

greater than 2000.  TSH is 2.1. 

 

MICROBIOLOGY:  Urine cultures, no growth.

 

IMAGING STUDIES:  Nothing new.

 

IMPRESSION:  

1. Syncopal episode, multifactorial, could be secondary to orthostatic hypotension.

2. History of orthostatic hypotension.

3. Alzheimer's dementia.

 

PLAN:  At this time, carotid ultrasound is pending.  MRI is pending later today.  EEG

has been performed, awaiting results.  2D echo, pending results.  Cardiology and

Neurology are following accordingly.  Otherwise, blood pressure and vitals seem to be

stable.  Labs are reviewed and stable.  At this time, await for the consultants.

Continue to work with PT and OT.  Likely 

discharge in the next 1-2 days.  I have talked plan of care with the son at bedside with

the nurse present. 

 

 

 

 

______________________________

MD BHARATHI Tracey/LEORA

D:  03/18/2019 13:47:06

T:  03/18/2019 17:03:38

Job #:  [id number]

## 2019-03-18 NOTE — ELECTROENCEPHALOGRAM
DATE OF STUDY:  03/18/2019

 

REQUESTING PHYSICIAN:  Radha Romero MD

 

HISTORY:  This 84-year-old man with history of syncope versus seizure is having 
an EEG

for evaluation of epileptiform activity.  The patient is not taking any 
medications,

which might affect the EEG. 

 

TECHNIQUE:  This is a routine, portable EEG, recorded digitally, using the 
International

10/20 electrode placement system, and done in the inpatient setting with the 
patient

awake and confused.  The EEG is adequate for interpretation. 

 

DESCRIPTION:  Poorly organized, poorly sustained 5-6 hertz activity is best seen

symmetrically over the posterior head regions.  Beta frequency activity is 
intermixed,

probably the result of medication effect.  No focal or epileptiform discharges 
are

seen.  Sleep is not recorded.  Photic stimulation does produce a driving 
response.

Hyperventilation is not performed. 

 

INTERPRETATION:  This is an abnormal EEG due to diffuse slowing of background

electrocortical activity compatible with a moderate generalized encephalopathy. 
No

epileptiform discharges are seen.  Clinical correlation is recommended. 

 

 

 

 

______________________________

Radha Romero MD

 

CP/MODL

D:  03/18/2019 18:27:50

T:  03/18/2019 20:49:41

Job #:  [id number]

 

MTDD

## 2019-03-18 NOTE — NUR
CM SPOKE TO PHYSICAL THERAPY REGARDING SAFE DISCHARGE PLAN. PT RECOMMENDED HOME HEALTH 
SERVICES ONLY IF PATIENT IS ABLE TO HAVE 24 HR/DAY SUPPORT AT HOME. PATIENT SON IS IN THE 
PROCESS OF HIRING A CAREGIVER ABLE TO DELIVER THOSE SERVICES. PATIENT DISCHARGE PLAN AT THIS 
TIME: HOME HEALTH WITH PT/ OT EVAL AND SN EVAL WITH ROLLING WALKER. PENDING ORDER FROM DR. LOPEZ.

## 2019-03-18 NOTE — DIAGNOSTIC IMAGING REPORT
History: Passed out

Comparison studies: CT head 3/16/2019



Technique: 

Sagittal T2; axial DWI, FLAIR, MPGR, T1, Coronal FLAIR.

Intravenous contrast: None



Findings:



Scalp: Normal in signal . No masses .

Bone marrow: Normal in signal intensity.



Extra-axial:

No masses, no fluid collections. 



Brain sulci: Mildly prominent.

Ventricles: Mildly prominent . No hydrocephalus .



Parenchyma:

Small T2/FLAIR hyperintensities of the periventricular and deep white matter

No masses, hemorrhage, acute or chronic vascular insults.



Suprasellar region: No abnormalities.

Craniocervical junction: No abnormalities.  Patent foramen magnum.  No Chiari

one malformation.

Vessels: Normal flow-voids in the arteries and sinuses.



Bilateral cataract surgery changes.



IMPRESSION:



1.  No acute abnormalities.



2.  Mild chronic microvascular ischemic changes of the white matter and diffuse

volume loss



Signed by: DR Jose Angel M.D. on 3/18/2019 3:54 PM

## 2019-03-18 NOTE — PROGRESS NOTE
DATE:  03/18/2019

 

Cardiology Progress Note 

 

SUBJECTIVE:  The patient denies chest pain or shortness of breath.  He just completed

EEG. 

 

OBJECTIVE:  VITAL SIGNS:  Temperature 95.8 degrees, pulse 71, respiratory rate 18, blood

pressure 168/73, oxygen saturation 95% on room air. 

GENERAL:  An elderly man, in no acute distress, awake and alert. 

LUNGS:  Clear to auscultation bilaterally.  No wheezes or crackles. 

CARDIOVASCULAR:  Normal rate, regular rhythm.  No murmur.  Normal S1, S2. 

ABDOMEN:  Soft, nontender. 

EXTREMITIES:  No edema.

 

CARDIAC MEDICATIONS:  Fludrocortisone 0.1 mg p.o. daily, aspirin 81 mg p.o. daily, and

enoxaparin 30 mg subcu daily. 

 

LABORATORY DATA:  WBC 5.53, hemoglobin 12.7, hematocrit 38, platelets 199.  Sodium 142,

potassium 3.5, chloride 110, CO2 of 24, BUN 15, creatinine 0.91.  MRI brain; no acute

abnormalities, mild chronic microvascular ischemic changes of the white matter and

diffuse volume loss. 

 

TELEMETRY:  Normal sinus rhythm.

 

IMPRESSION:  

1. Syncope, suspect orthostatic.

2. Question possible seizure.

3. Alzheimer's dementia.

 

RECOMMENDATIONS:  Continue current cardiac medications.  Monitor the patient on

telemetry.  This can be held for MRI as necessary.  Continue Florinef.  Recommend

compression stockings.  We may need to tolerate supine hypertension for adequate blood

pressure while the patient is standing. 

 

Thank you for this consult.  We will continue to follow.

 

 

 

 

______________________________

Magali Carreon MD

 

ABS/MODL

D:  03/18/2019 16:32:25

T:  03/18/2019 18:34:54

Job #:  [id number]

## 2019-03-18 NOTE — NUR
ASSESSMENT DONE.NO RESP.DISTRESS.NO PAIN VOICED. WAS IN THE UNIT.CARE GIVER IS 
WITH THE PT.BED ALARM IS ON.BED LOCKED AND IN LOWEST POSITION.PHONE AND CALL LIGHT WITHIN 
REACH.AMBULATORY WITH ASSISTANCE.ASSISTED TO USE REST ROOM.VOIDED.PT IS BACK TO BED.

## 2019-03-19 VITALS — SYSTOLIC BLOOD PRESSURE: 186 MMHG | DIASTOLIC BLOOD PRESSURE: 96 MMHG

## 2019-03-19 VITALS — DIASTOLIC BLOOD PRESSURE: 81 MMHG | SYSTOLIC BLOOD PRESSURE: 161 MMHG

## 2019-03-19 VITALS — SYSTOLIC BLOOD PRESSURE: 188 MMHG | DIASTOLIC BLOOD PRESSURE: 74 MMHG

## 2019-03-19 VITALS — DIASTOLIC BLOOD PRESSURE: 77 MMHG | SYSTOLIC BLOOD PRESSURE: 168 MMHG

## 2019-03-19 VITALS — DIASTOLIC BLOOD PRESSURE: 74 MMHG | SYSTOLIC BLOOD PRESSURE: 188 MMHG

## 2019-03-19 RX ADMIN — MEMANTINE HYDROCHLORIDE SCH MG: 10 TABLET ORAL at 09:00

## 2019-03-19 RX ADMIN — Medication SCH MG: at 09:45

## 2019-03-19 RX ADMIN — SODIUM CHLORIDE SCH MLS/HR: 9 INJECTION, SOLUTION INTRAVENOUS at 10:30

## 2019-03-19 RX ADMIN — FLUDROCORTISONE ACETATE SCH MG: 0.1 TABLET ORAL at 09:45

## 2019-03-19 RX ADMIN — RIVASTIGMINE SCH EACH: 9.5 PATCH, EXTENDED RELEASE TRANSDERMAL at 09:00

## 2019-03-19 RX ADMIN — ASPIRIN 81 MG CHEWABLE TABLET SCH MG: 81 TABLET CHEWABLE at 09:45

## 2019-03-19 NOTE — NUR
Visit made by the Spiritual Care Department Pastoral Visitor, Janel Plata. PV provided 
pastoral presence, prayer, hospitality, and supportive listening. 



Pastoral Visitor informed pt/family of the scope of Chaplaincy Services and availability.



FRANKIE VITALE



Spiritual Care Department

O: [phone]

Pager: [phone] ([phone])

## 2019-03-19 NOTE — NUR
Received patient and walking rounds complete. Patient asleep at this time, no signs of 
distress. Son at bedside. Call light in reach, will continue to monitor.

## 2019-03-19 NOTE — PROGRESS NOTE
DATE:  03/19/2019

 

Cardiology Progress Note 

 

SUBJECTIVE:  The patient denies complaints.

 

OBJECTIVE:  VITAL SIGNS:  Temperature 96.8 degrees, pulse 70, respiratory rate 18, blood

pressure 168/77, oxygen saturation 92% on room air. 

GENERAL:  Elderly man, in no acute distress.  Awake and alert. 

LUNGS:  Clear to auscultation bilaterally.  No wheezes or crackles. 

CARDIOVASCULAR:  Normal rate, regular rhythm.  No murmur.  Normal S1, S2. 

ABDOMEN:  Soft, nontender. 

EXTREMITIES:  No edema.

 

CARDAIC MEDICATIONS:  Fludrocortisone 0.1 mg p.o. daily, aspirin 81 mg p.o. daily.

 

LABORATORY DATA:  None today.

 

TELEMETRY:  Normal sinus rhythm.

 

IMPRESSION:  

1. Syncope, suspect orthostatic hypotension.

2. Question possible seizure.

3. Alzheimer's dementia.

 

RECOMMENDATIONS:  Continue current cardiac medications.  Monitor the patient on

telemetry.  No arrhythmias have been found.  Recommend outpatient telemetry monitoring

for further evaluation.  Continue Florinef.  Recommend compression stockings.  We may

need to tolerate supine hypertension for adequate blood pressure while the patient is

standing.  The patient is undergoing neurology evaluation for possible seizure.  No

hemodynamically significant stenosis was found on carotid Doppler and echocardiogram.

There is a normal LV size with pseudonormal LV filling.  There was moderate aortic

regurgitation.  No other significant valvular abnormalities were documented. 

 

Thank you for this consult.  We will continue to follow.

 

 

 

 

______________________________

Magali Carreon MD

 

ABS/MODL

D:  03/19/2019 13:54:17

T:  03/19/2019 17:15:06

Job #:  [id number]

## 2019-03-19 NOTE — NUR
Patient discharged from facility. Patient and son gathered all personal belongings, 
discharge paperwork, and follow up information. Vital signs stable during discharge. No 
distress when leaving facility.

## 2019-03-19 NOTE — NUR
PT IS AGITATED AFTER TAKING V/S.BP /86 MMOF HG.CARE GIVER STATED THAT PT TRIED TO KICK 
HER.CALLED FOR HELP.I REACHED THE ROOM.SAULO HOWARD WAS THERE.PT PULLED OUT IV CANNULA.CARE 
GIVER CONTACTED HIS SON.INFRONT OF HIS  SON I CHECKED BP MANUALLY NOTED 186/86 .PAGED TO 
.PT'S  SON STATED THAT "BP IS OK.USUALLY HAPPEN.DON'T CHECK VITALS  @ 4AM.WAIT TO 
START IV AFTER 7AM.LET MY FATHER   TAKE REST".APPLIED PRESSURE DRESSING ON IV SITE.

## 2019-03-20 NOTE — DISCHARGE SUMMARY
FINAL DISCHARGE DIAGNOSES:  

1. Syncopal episode, multifactorial, likely due to orthostatic hypotension.

2. History of orthostatic hypotension, but currently blood pressure is stable.

3. Alzheimer's dementia.

4. Generalized weakness, and medically debilitated.

 

CONSULTANTS:  Neurology and Cardiology.

 

PHYSICAL EXAMINATION:

VITAL SIGNS:  Temperature is 97.9, pulse 73, respiratory rate is 18, blood pressure is

162/82, pulse ox 96% on room air. 

LAB FINDINGS:  Showed a white count of __________ hemoglobin 12.7, hematocrit of 38,

platelets of 199.  Coagulation, PT 14, INR 1, PTT 32.  Chemistry, sodium 143, potassium

3.5, chloride 110, bicarb 24, anion gap of 11, BUN 15, creatinine is 0.91, calcium 7.3.

Vitamin B12 greater than 2000 __________ total bilirubin is 0.7, ALT of 16, AST is 30.

Troponins were negative.  .  Urinalysis was negative.  Urine culture was

negative. 

 

IMAGING STUDIES:  Chest x-ray showed some mild interstitial edema.  CT cervical spine,

no acute osseous abnormality, multilevel spondylosis advanced to C5-C6 and C6-C7.  No

evidence of any acute findings.  CT brain, no acute intracranial abnormality.  Carotid

ultrasound found to be negative for any stenosis.  A 2D echo was found to be normal and

EF of 60%.  EEG was found to be normal, so no evidence of seizures.  MRI of the brain

showed no acute abnormalities.  Showed some mild chronic microvascular ischemic changes. 

 

HOSPITAL COURSE:  This is an 84-year-old  male, who came in after having a

syncopal episode that occurred at home.  The patient was admitted for further

evaluation.  Neurology was consulted, in which EEG and MRI was performed and found to be

negative.  The patient was then cleared by Neurology standpoint.  No further

neurological workup is needed.  Cardiology was consulted as well and the patient was on

cardiac telemetry.  Cardiac enzymes were found to be negative.  No __________ cardiac

telemetry.  EKG showed no acute findings.  A 2D echo was found to be normal range as

well as a carotid ultrasound found to be normal.  All imaging studies were found to be

negative as well.  It was thought the possibility that the patient likely has

orthostatic hypotension leading to a possible syncopal episode.  The patient is

currently doing well with no complaints and has been cleared by both consultants,

Neurology and Cardiology for discharge home.  I discussed plan of care with the patient

and his son at bedside and they verbalized understanding.  On the day of discharge,

vital signs stable, labs being stable.  The patient seen, evaluated, and examined

thoroughly on the day of discharge.  No other complaints.  The patient verbalized

understanding and agreed with plan of care.  Followup appointment as an outpatient with

the primary care physician in __________ 

 

MEDICATIONS:  See med reconciliation form.

 

DISPOSITION:  To home.

 

CONDITION:  Stable.

 

DIET:  Heart healthy. 

 

In any event of new or worsening symptoms, the patient is advised to come back to the ED

for further evaluation. 

 

Discharge summary took greater than 35 minutes.

 

 

 

 

______________________________

MD BHARATHI Tracey/MODL

D:  03/19/2019 12:28:19

T:  03/20/2019 05:25:06

Job #:  [id number]

## 2020-03-24 NOTE — NUR
Patient A/O X2, even respirations on RA. Bowel sounds active, skin intact, no edema. Patient 
needs assistance with care. Cardiac diet at this time, tolerating well. Compression 
stockings bilaterally. No complaint of pain or discomfort at this time. Son at bedside. Bed 
in lowest position, wheels locked, side rails x2, call light in reach, will continue to 
monitor. English

## 2021-03-14 ENCOUNTER — HOSPITAL ENCOUNTER (EMERGENCY)
Dept: HOSPITAL 88 - ER | Age: 86
Discharge: HOME | End: 2021-03-14
Payer: MEDICARE

## 2021-03-14 VITALS — SYSTOLIC BLOOD PRESSURE: 145 MMHG | DIASTOLIC BLOOD PRESSURE: 62 MMHG
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
## 2021-03-14 DIAGNOSIS — S00.83XA: Primary | ICD-10-CM

## 2021-03-14 DIAGNOSIS — I10: ICD-10-CM
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
## 2021-03-14 DIAGNOSIS — F03.90: ICD-10-CM

## 2021-03-14 PROCEDURE — 70450 CT HEAD/BRAIN W/O DYE: CPT

## 2021-03-14 PROCEDURE — 72125 CT NECK SPINE W/O DYE: CPT

## 2021-03-14 PROCEDURE — 99283 EMERGENCY DEPT VISIT LOW MDM: CPT
